# Patient Record
Sex: MALE | Race: WHITE | NOT HISPANIC OR LATINO | Employment: STUDENT | URBAN - METROPOLITAN AREA
[De-identification: names, ages, dates, MRNs, and addresses within clinical notes are randomized per-mention and may not be internally consistent; named-entity substitution may affect disease eponyms.]

---

## 2018-08-09 ENCOUNTER — OFFICE VISIT (OUTPATIENT)
Dept: INTERNAL MEDICINE | Facility: CLINIC | Age: 33
End: 2018-08-09
Payer: COMMERCIAL

## 2018-08-09 VITALS
WEIGHT: 173 LBS | DIASTOLIC BLOOD PRESSURE: 68 MMHG | BODY MASS INDEX: 26.22 KG/M2 | HEIGHT: 68 IN | HEART RATE: 82 BPM | SYSTOLIC BLOOD PRESSURE: 120 MMHG | OXYGEN SATURATION: 98 %

## 2018-08-09 DIAGNOSIS — Z00.00 HEALTH CARE MAINTENANCE: Primary | ICD-10-CM

## 2018-08-09 DIAGNOSIS — F42.9 OBSESSIVE-COMPULSIVE DISORDER, UNSPECIFIED TYPE: ICD-10-CM

## 2018-08-09 DIAGNOSIS — Z80.42 FAMILY HISTORY OF MALIGNANT NEOPLASM OF PROSTATE: ICD-10-CM

## 2018-08-09 DIAGNOSIS — Z12.5 SPECIAL SCREENING FOR MALIGNANT NEOPLASM OF PROSTATE: ICD-10-CM

## 2018-08-09 LAB
BILIRUB UR QL STRIP: NEGATIVE
CLARITY UR REFRACT.AUTO: ABNORMAL
COLOR UR AUTO: YELLOW
GLUCOSE UR QL STRIP: NEGATIVE
HGB UR QL STRIP: NEGATIVE
KETONES UR QL STRIP: NEGATIVE
LEUKOCYTE ESTERASE UR QL STRIP: NEGATIVE
MICROSCOPIC COMMENT: NORMAL
NITRITE UR QL STRIP: NEGATIVE
PH UR STRIP: 5 [PH] (ref 5–8)
PROT UR QL STRIP: NEGATIVE
SP GR UR STRIP: 1.02 (ref 1–1.03)
URN SPEC COLLECT METH UR: ABNORMAL
UROBILINOGEN UR STRIP-ACNC: NEGATIVE EU/DL
WBC #/AREA URNS AUTO: 1 /HPF (ref 0–5)

## 2018-08-09 PROCEDURE — 81001 URINALYSIS AUTO W/SCOPE: CPT

## 2018-08-09 PROCEDURE — 99385 PREV VISIT NEW AGE 18-39: CPT | Mod: S$GLB,,, | Performed by: INTERNAL MEDICINE

## 2018-08-09 PROCEDURE — 99999 PR PBB SHADOW E&M-NEW PATIENT-LVL IV: CPT | Mod: PBBFAC,,, | Performed by: INTERNAL MEDICINE

## 2018-08-09 NOTE — PROGRESS NOTES
"Subjective:       Patient ID: Emre Rapp is a 33 y.o. male.    Chief Complaint: Establish Care (establishing care as a new patient. )    HPI   Emre Rapp is a 33 y.o. male here to establish care and have yearly preventative healthcare visit.     He is originally from [place].   He has OCD. On medications as child but not currently. Has done lots of CBT.  Previously with handwashing obsession, now more existential.  He is a medical student.    Dad recently diagnosed with prostate cancer. Paternal uncles have also had prostate cancer (4 brothers total.) The youngest brother diagnosed was in late 50s, the rest over 65.     He has obsessional thoughts about his health - thinking about all the places cancer could be, etc. He recognizes that most of these thoughts are irrational and part of the OCD but does think that further investigation of family hx of prostate cancer would be rational.     He is interested in further CBT.     Review of Systems   Constitutional: Negative for fever.   HENT: Negative.    Eyes: Negative.    Respiratory: Negative for shortness of breath.    Cardiovascular: Negative for chest pain and leg swelling.   Gastrointestinal: Negative for abdominal pain, diarrhea, nausea and vomiting.   Genitourinary: Negative.    Musculoskeletal: Negative for arthralgias.   Skin: Negative for rash.   Psychiatric/Behavioral: Negative.        Objective:   /68 (BP Location: Right arm, Patient Position: Sitting, BP Method: Medium (Manual))   Pulse 82   Ht 5' 8" (1.727 m)   Wt 78.5 kg (173 lb)   SpO2 98%   BMI 26.30 kg/m²      Physical Exam   Constitutional: He is oriented to person, place, and time. He appears well-developed and well-nourished.   HENT:   Head: Normocephalic and atraumatic.   Eyes: Conjunctivae and EOM are normal. Pupils are equal, round, and reactive to light.   Neck: Neck supple. No thyromegaly present.   Cardiovascular: Normal rate, regular rhythm and normal heart sounds.    No " murmur heard.  Pulmonary/Chest: Effort normal and breath sounds normal. No respiratory distress. He has no wheezes.   Abdominal: Soft. Bowel sounds are normal. He exhibits no distension. There is no tenderness.   Musculoskeletal: Normal range of motion.   Neurological: He is alert and oriented to person, place, and time.   Skin: Skin is warm and dry. No rash noted.   Psychiatric: He has a normal mood and affect. Judgment and thought content normal.   Vitals reviewed.      Assessment:       1. Health care maintenance    2. Special screening for malignant neoplasm of prostate    3. Family history of malignant neoplasm of prostate    4. Obsessive-compulsive disorder, unspecified type        Plan:       Emre was seen today for [facility] care.    Diagnoses and all orders for this visit:    Health care maintenance  -     CBC auto differential; Future  -     Comprehensive metabolic panel; Future  -     Hemoglobin A1c; Future  -     TSH; Future  -     Vitamin D; Future  -     Lipid panel; Future  -     Urinalysis    Special screening for malignant neoplasm of prostate  -     PSA, Screening; Future    Family history of malignant neoplasm of prostate in father and 3 maternal uncles. I believe further investigation of this would be reasonable.   -     PSA, Screening; Future - baseline   urinalysis  -     Ambulatory referral to Urology    Obsessive-compulsive disorder, unspecified type  -     Ambulatory consult to Psychology

## 2018-08-09 NOTE — PATIENT INSTRUCTIONS
Cognitive Behavior Therapy [facility]  [phone]   [email or web address]

## 2018-08-11 ENCOUNTER — LAB VISIT (OUTPATIENT)
Dept: LAB | Facility: HOSPITAL | Age: 33
End: 2018-08-11
Attending: INTERNAL MEDICINE
Payer: COMMERCIAL

## 2018-08-11 DIAGNOSIS — Z12.5 SPECIAL SCREENING FOR MALIGNANT NEOPLASM OF PROSTATE: ICD-10-CM

## 2018-08-11 DIAGNOSIS — Z00.00 HEALTH CARE MAINTENANCE: ICD-10-CM

## 2018-08-11 DIAGNOSIS — Z80.42 FAMILY HISTORY OF MALIGNANT NEOPLASM OF PROSTATE: ICD-10-CM

## 2018-08-11 LAB
25(OH)D3+25(OH)D2 SERPL-MCNC: 17 NG/ML
ALBUMIN SERPL BCP-MCNC: 4.3 G/DL
ALP SERPL-CCNC: 47 U/L
ALT SERPL W/O P-5'-P-CCNC: 14 U/L
ANION GAP SERPL CALC-SCNC: 6 MMOL/L
AST SERPL-CCNC: 15 U/L
BASOPHILS # BLD AUTO: 0.03 K/UL
BASOPHILS NFR BLD: 0.6 %
BILIRUB SERPL-MCNC: 0.7 MG/DL
BUN SERPL-MCNC: 13 MG/DL
CALCIUM SERPL-MCNC: 9.1 MG/DL
CHLORIDE SERPL-SCNC: 112 MMOL/L
CHOLEST SERPL-MCNC: 182 MG/DL
CHOLEST/HDLC SERPL: 3.3 {RATIO}
CO2 SERPL-SCNC: 24 MMOL/L
COMPLEXED PSA SERPL-MCNC: 0.65 NG/ML
CREAT SERPL-MCNC: 1.1 MG/DL
DIFFERENTIAL METHOD: ABNORMAL
EOSINOPHIL # BLD AUTO: 0.1 K/UL
EOSINOPHIL NFR BLD: 1.8 %
ERYTHROCYTE [DISTWIDTH] IN BLOOD BY AUTOMATED COUNT: 11.9 %
EST. GFR  (AFRICAN AMERICAN): >60 ML/MIN/1.73 M^2
EST. GFR  (NON AFRICAN AMERICAN): >60 ML/MIN/1.73 M^2
ESTIMATED AVG GLUCOSE: 97 MG/DL
GLUCOSE SERPL-MCNC: 96 MG/DL
HBA1C MFR BLD HPLC: 5 %
HCT VFR BLD AUTO: 40.7 %
HDLC SERPL-MCNC: 56 MG/DL
HDLC SERPL: 30.8 %
HGB BLD-MCNC: 14.1 G/DL
LDLC SERPL CALC-MCNC: 117.4 MG/DL
LYMPHOCYTES # BLD AUTO: 2.8 K/UL
LYMPHOCYTES NFR BLD: 55.9 %
MCH RBC QN AUTO: 29.9 PG
MCHC RBC AUTO-ENTMCNC: 34.6 G/DL
MCV RBC AUTO: 86 FL
MONOCYTES # BLD AUTO: 0.4 K/UL
MONOCYTES NFR BLD: 8.7 %
NEUTROPHILS # BLD AUTO: 1.6 K/UL
NEUTROPHILS NFR BLD: 33 %
NONHDLC SERPL-MCNC: 126 MG/DL
PLATELET # BLD AUTO: 267 K/UL
PMV BLD AUTO: 10.2 FL
POTASSIUM SERPL-SCNC: 4.3 MMOL/L
PROT SERPL-MCNC: 6.8 G/DL
RBC # BLD AUTO: 4.72 M/UL
SODIUM SERPL-SCNC: 142 MMOL/L
TRIGL SERPL-MCNC: 43 MG/DL
TSH SERPL DL<=0.005 MIU/L-ACNC: 0.96 UIU/ML
WBC # BLD AUTO: 4.94 K/UL

## 2018-08-11 PROCEDURE — 84153 ASSAY OF PSA TOTAL: CPT

## 2018-08-11 PROCEDURE — 84443 ASSAY THYROID STIM HORMONE: CPT

## 2018-08-11 PROCEDURE — 82306 VITAMIN D 25 HYDROXY: CPT

## 2018-08-11 PROCEDURE — 80053 COMPREHEN METABOLIC PANEL: CPT

## 2018-08-11 PROCEDURE — 85025 COMPLETE CBC W/AUTO DIFF WBC: CPT

## 2018-08-11 PROCEDURE — 83036 HEMOGLOBIN GLYCOSYLATED A1C: CPT

## 2018-08-11 PROCEDURE — 36415 COLL VENOUS BLD VENIPUNCTURE: CPT

## 2018-08-11 PROCEDURE — 80061 LIPID PANEL: CPT

## 2018-08-14 ENCOUNTER — OFFICE VISIT (OUTPATIENT)
Dept: UROLOGY | Facility: CLINIC | Age: 33
End: 2018-08-14
Payer: COMMERCIAL

## 2018-08-14 DIAGNOSIS — Z80.42 FAMILY HISTORY OF PROSTATE CANCER: Primary | ICD-10-CM

## 2018-08-14 PROCEDURE — 99999 PR PBB SHADOW E&M-EST. PATIENT-LVL II: CPT | Mod: PBBFAC,,, | Performed by: NURSE PRACTITIONER

## 2018-08-14 PROCEDURE — 99202 OFFICE O/P NEW SF 15 MIN: CPT | Mod: S$GLB,,, | Performed by: NURSE PRACTITIONER

## 2018-08-14 NOTE — LETTER
August 15, 2018      Bonita Rivera MD  [address]  [place] [address]           [facility] - Urology 4th Floor  [address]  [facility] [address]  Phone: [phone]          Patient: Emre Rapp   MR Number: [id number]   YOB: 1985   Date of Visit: 8/14/2018       Dear Dr. Bonita iRvera:    Thank you for referring Emre Rapp to me for evaluation. Attached you will find relevant portions of my assessment and plan of care.    If you have questions, please do not hesitate to call me. I look forward to following Emre Rapp along with you.    Sincerely,    Callie Gonzales, KIKO    Enclosure  CC:  No Recipients    If you would like to receive this communication electronically, please contact [email or web address] or [phone] to request more information on [employer] Link access.    For providers and/or their staff who would like to refer a patient to [facility], please contact us through our one-stop-shop provider referral line, Melody Jaime, at [phone].    If you feel you have received this communication in error or would no longer like to receive these types of communications, please e-mail [email or web address]

## 2018-08-15 ENCOUNTER — PATIENT MESSAGE (OUTPATIENT)
Dept: UROLOGY | Facility: CLINIC | Age: 33
End: 2018-08-15

## 2018-08-15 ENCOUNTER — TELEPHONE (OUTPATIENT)
Dept: UROLOGY | Facility: CLINIC | Age: 33
End: 2018-08-15

## 2018-08-15 NOTE — TELEPHONE ENCOUNTER
Called patient regarding f/u of genetic testing for prostate cancer. No answer. Will send message through portal.

## 2018-08-15 NOTE — PROGRESS NOTES
Subjective:       Patient ID: Emre Rapp is a 33 y.o. male.    Chief Complaint: Prostate check / + Family history of prostate cancer      HPI: Emre Rapp is a 33 y.o. White male who presents today for evaluation and management of prostate check. He is an established patient with [facility] but a new patient to me. This is his initial clinic visit.    He established care recently with PCP. He has family history of prostate cancer. PCP ordered PSA and referred him to Urology.    Pt reports a family history of prostate cancer (father and 3 paternal uncles). His father was diagnosed after age 65 and is scheduled to have prostatectomy. Pt is concerned and is interested in genetic testing for prostate cancer. Denies urinary complaints. Denies dysuria, hematuria, or flank pain. Denies f/c/n/v.    Lab Results   Component Value Date    PSA 0.65 08/11/2018     Review of patient's allergies indicates:  No Known Allergies    No current outpatient medications on file.     No current facility-administered medications for this visit.        Past Medical History:   Diagnosis Date    OCD (obsessive compulsive disorder)        History reviewed. No pertinent surgical history.    Family History   Problem Relation Age of Onset    Prostate cancer Father 67    Hyperlipidemia Father     Hypertension Father     Prostate cancer Paternal Uncle 58    Prostate cancer Paternal Uncle 70    Prostate cancer Paternal Uncle 67    Diabetes Mother     No Known Problems Sister     Cancer Maternal Grandmother         GI - unsure type - colon?    Heart attack Maternal Grandfather     Anuerysm Paternal Grandfather 64        cerebral    Diabetes Maternal Uncle        Review of Systems   Constitutional: Negative for chills, diaphoresis and fever.   HENT: Negative for congestion and trouble swallowing.    Eyes: Negative for visual disturbance.   Respiratory: Negative for chest tightness and shortness of breath.    Cardiovascular: Negative  for chest pain and palpitations.   Gastrointestinal: Negative for nausea and vomiting.   Genitourinary: Negative for decreased urine volume, difficulty urinating, discharge, dysuria, enuresis, flank pain, frequency, hematuria, penile pain, penile swelling, scrotal swelling, testicular pain and urgency.   Musculoskeletal: Negative for gait problem.   Skin: Negative for rash.   Allergic/Immunologic: Negative for immunocompromised state.   Neurological: Negative for seizures, syncope and headaches.   Hematological: Negative for adenopathy.   Psychiatric/Behavioral: Negative for confusion.         All other systems were reviewed and were negative.    Objective:   There were no vitals filed for this visit.     Physical Exam   Nursing note and vitals reviewed.  Constitutional: He is oriented to person, place, and time. He appears well-developed and well-nourished.  Non-toxic appearance. He does not have a sickly appearance. He does not appear ill. No distress.   HENT:   Head: Normocephalic.   Eyes: Conjunctivae are normal.   Neck: Normal range of motion.   Cardiovascular: Normal rate and regular rhythm.    Pulmonary/Chest: Effort normal. No respiratory distress.   Abdominal: Soft. He exhibits no distension. There is no CVA tenderness.   Genitourinary:       Musculoskeletal: Normal range of motion.   Neurological: He is alert and oriented to person, place, and time.   Skin: Skin is warm and dry.     Psychiatric: He has a normal mood and affect. His behavior is normal. Judgment and thought content normal.         Lab Results   Component Value Date    CREATININE 1.1 08/11/2018     Lab Results   Component Value Date    EGFRNONAA >60 08/11/2018     Lab Results   Component Value Date    ESTGFRAFRICA >60 08/11/2018     POCT UA: negative results    Assessment:       1. Family history of prostate cancer        Plan:     Emre was seen today for other.    Diagnoses and all orders for this visit:    Family history of prostate  cancer    -Discussed plan with patient  -Informed patient we do not do genetic testing for prostate cancer in Urology.   I will speak to one of the Urologist and see if they have any recommendations on who to see regarding genetic testing for prostate cancer.  -Reassured patient negative JESSIE and PSA normal.  Recommend yearly screening starting at age 40.  -RTC as needed       I spent 30 minutes with the patient of which more than half was spent in coordinating the patient's care as well as in direct consultation with the patient in regards to our treatment and plan.

## 2019-05-27 ENCOUNTER — TELEPHONE (OUTPATIENT)
Dept: INTERNAL MEDICINE | Facility: CLINIC | Age: 34
End: 2019-05-27

## 2019-05-27 NOTE — TELEPHONE ENCOUNTER
----- Message from Priscilla Raman sent at 5/27/2019  9:20 AM CDT -----  Contact: self/[phone]  Pt called in regards to talking to the office about what shot does he need. He is trying to fill out a work application.      Please advise

## 2019-05-28 ENCOUNTER — OFFICE VISIT (OUTPATIENT)
Dept: INTERNAL MEDICINE | Facility: CLINIC | Age: 34
End: 2019-05-28
Payer: COMMERCIAL

## 2019-05-28 ENCOUNTER — CLINICAL SUPPORT (OUTPATIENT)
Dept: INTERNAL MEDICINE | Facility: CLINIC | Age: 34
End: 2019-05-28
Payer: COMMERCIAL

## 2019-05-28 VITALS
BODY MASS INDEX: 27.96 KG/M2 | HEIGHT: 68 IN | OXYGEN SATURATION: 97 % | DIASTOLIC BLOOD PRESSURE: 88 MMHG | WEIGHT: 184.5 LBS | HEART RATE: 80 BPM | SYSTOLIC BLOOD PRESSURE: 130 MMHG

## 2019-05-28 DIAGNOSIS — M79.601 RIGHT ARM PAIN: ICD-10-CM

## 2019-05-28 PROCEDURE — 90471 TDAP VACCINE GREATER THAN OR EQUAL TO 7YO IM: ICD-10-PCS | Mod: S$GLB,,, | Performed by: STUDENT IN AN ORGANIZED HEALTH CARE EDUCATION/TRAINING PROGRAM

## 2019-05-28 PROCEDURE — 99213 PR OFFICE/OUTPT VISIT, EST, LEVL III, 20-29 MIN: ICD-10-PCS | Mod: 25,S$GLB,, | Performed by: STUDENT IN AN ORGANIZED HEALTH CARE EDUCATION/TRAINING PROGRAM

## 2019-05-28 PROCEDURE — 3008F PR BODY MASS INDEX (BMI) DOCUMENTED: ICD-10-PCS | Mod: CPTII,S$GLB,, | Performed by: STUDENT IN AN ORGANIZED HEALTH CARE EDUCATION/TRAINING PROGRAM

## 2019-05-28 PROCEDURE — 99999 PR PBB SHADOW E&M-EST. PATIENT-LVL III: ICD-10-PCS | Mod: PBBFAC,,, | Performed by: STUDENT IN AN ORGANIZED HEALTH CARE EDUCATION/TRAINING PROGRAM

## 2019-05-28 PROCEDURE — 99213 OFFICE O/P EST LOW 20 MIN: CPT | Mod: 25,S$GLB,, | Performed by: STUDENT IN AN ORGANIZED HEALTH CARE EDUCATION/TRAINING PROGRAM

## 2019-05-28 PROCEDURE — 90471 IMMUNIZATION ADMIN: CPT | Mod: S$GLB,,, | Performed by: STUDENT IN AN ORGANIZED HEALTH CARE EDUCATION/TRAINING PROGRAM

## 2019-05-28 PROCEDURE — 90715 TDAP VACCINE 7 YRS/> IM: CPT | Mod: S$GLB,,, | Performed by: STUDENT IN AN ORGANIZED HEALTH CARE EDUCATION/TRAINING PROGRAM

## 2019-05-28 PROCEDURE — 3008F BODY MASS INDEX DOCD: CPT | Mod: CPTII,S$GLB,, | Performed by: STUDENT IN AN ORGANIZED HEALTH CARE EDUCATION/TRAINING PROGRAM

## 2019-05-28 PROCEDURE — 99999 PR PBB SHADOW E&M-EST. PATIENT-LVL III: CPT | Mod: PBBFAC,,, | Performed by: STUDENT IN AN ORGANIZED HEALTH CARE EDUCATION/TRAINING PROGRAM

## 2019-05-28 PROCEDURE — 90715 TDAP VACCINE GREATER THAN OR EQUAL TO 7YO IM: ICD-10-PCS | Mod: S$GLB,,, | Performed by: STUDENT IN AN ORGANIZED HEALTH CARE EDUCATION/TRAINING PROGRAM

## 2019-05-28 NOTE — PATIENT INSTRUCTIONS
Please go to Immunization station to receive MMR, pertussis (dTap), Hepatitis A vaccinations. MMR and Hepatitis A will require another (a second) vaccine.

## 2019-05-28 NOTE — PROGRESS NOTES
"Internal Medicine Clinic Note  5/28/2019      Subjective:       Patient ID:  Emre is a 33 y.o. male being seen for an established visit.    Chief Complaint: Forms    Mr. Rapp is a 33 year old man who is presenting with right arm pain and to have medical forms filled out. Regarding the right arm pain, patient says that it started about a decade ago and has been getting worse recently. Patient is left handed. He states that the pain is from the wrist to the bicep that is a "dull ache". It's worse when he doesn't move the arm around for a long time. At its worst, the pain is at a 6. The pain is relieved with keeping the arm active. He has tried taking Ibuprofen 800mg, and says that is helped "some". The pain is at a 2 after movement and if he takes Ibuprofen although he does say that he has not taken the Ibuprofen enough to notice if it is truly helping with the pain. The pain has been constant and now interferes with activities that he enjoys such as rock climbing and canoeing, which he has not done for the past 3 months or so. He has not had any recent fevers, chills, numbness, tingling, trauma, injuries, or falls. He reports that sensation and motor strength have been intact. He is concerned that this pain may be related to an autoimmune or rheumatological disorder.    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 He needs proof of vaccinations.      Review of Systems   Constitutional: Negative for chills and fever.   HENT: Negative for congestion and sore throat.    Eyes: Negative for photophobia and pain.   Respiratory: Negative for cough and shortness of breath.    Cardiovascular: Negative for chest pain and leg swelling.   Gastrointestinal: Negative for abdominal pain and nausea.   Genitourinary: Negative for dysuria and flank pain.   Musculoskeletal: Positive for myalgias (R arm pain). Negative for falls and joint pain.   Neurological: Negative for dizziness and " "headaches.   Psychiatric/Behavioral: Negative for depression. The patient is not nervous/anxious.        Past Medical History:   Diagnosis Date    OCD (obsessive compulsive disorder)        Family History   Problem Relation Age of Onset    Prostate cancer Father 67    Hyperlipidemia Father     Hypertension Father     Prostate cancer Paternal Uncle 58    Prostate cancer Paternal Uncle 70    Prostate cancer Paternal Uncle 67    Diabetes Mother     No Known Problems Sister     Cancer Maternal Grandmother         GI - unsure type - colon?    Heart attack Maternal Grandfather     Anuerysm Paternal Grandfather 64        cerebral    Diabetes Maternal Uncle        Review of patient's allergies indicates:  No Known Allergies    Patient Active Problem List   Diagnosis    Right arm pain           Objective:      /88 (BP Location: Left arm, Patient Position: Sitting, BP Method: Large (Manual))   Pulse 80   Ht 5' 8" (1.727 m)   Wt 83.7 kg (184 lb 8.4 oz)   SpO2 97%   BMI 28.06 kg/m²   Estimated body mass index is 28.06 kg/m² as calculated from the following:    Height as of this encounter: 5' 8" (1.727 m).    Weight as of this encounter: 83.7 kg (184 lb 8.4 oz).     Physical Exam   Constitutional: He is oriented to person, place, and time and well-developed, well-nourished, and in no distress. No distress.   HENT:   Head: Normocephalic and atraumatic.   Eyes: EOM are normal.   Neck: Normal range of motion. Neck supple.   Cardiovascular: Normal rate and regular rhythm.   No murmur heard.  Pulmonary/Chest: Effort normal and breath sounds normal. No respiratory distress.   Abdominal: Soft. Bowel sounds are normal. He exhibits no distension. There is no tenderness.   Musculoskeletal: Normal range of motion. He exhibits tenderness (tenderness on palpation of lower arm tendons ). He exhibits no edema or deformity.   No joint swelling or erythema of the joints   Neurological: He is alert and oriented to person, " place, and time.   Skin: Skin is warm and dry.   Psychiatric: Affect and judgment normal.         Assessment and Plan:         Emre was seen today for forms and right arm pain.    Diagnoses and all orders for this visit:    Medical forms were filled out based on previous vaccination records. As there was not adequate documentation for pertussis, Hepatitis A, and MMR, patient was encouraged to receive all 3 vaccinations today. Patient will receive tDap along with first doses of Hepatitis A and MMR today. He was encouraged to received the second dose of MMR within a month and the second dose of Hepatitis A within 6-12 months.     Right arm pain   Patient's HPI and PE appear to be more consistent with musculoskeletal issue due to overuse. Patient does not have any joint swelling, erythema, rashes that would be concerning for an autoimmune or rheumatological disorder. He does not have sensation or motor strength issues that would indicate a Neurological problem. Previous labs done in August 2018 were largely unremarkable. Patient was taking Ibuprofen as needed and not enough to the point whether he could detect if it helped with the pain. Patient was encouraged to take Ibuprofen up to 800mg three times a day as a maximum dose. He was advised to take this an hour before physical activity and afterwards as well. He was also encouraged to try Aleve if he felt that Ibuprofen was not improving the pain as well as add Tylenol to Ibuprofen or Aleve alone.       Follow up if symptoms worsen or fail to improve.    Other Orders Placed This Visit:  No orders of the defined types were placed in this encounter.        Patient seen, and case discussed with Dr. Lee.        Miroslava Ojeda MD PGY-1  [facility]  Internal Medicine

## 2019-06-06 NOTE — PROGRESS NOTES
Preceptor note    Patient's history and physical discussed, please refer to resident physician's note for specific details. Pt seen and examined with resident physician. Bilateral upper extremity exam - normal muscle tone; no joint swelling; full ROM. Medical record reviewed. I agree with resident's assessment and plan.

## 2019-09-29 ENCOUNTER — HEALTH MAINTENANCE LETTER (OUTPATIENT)
Age: 34
End: 2019-09-29

## 2020-10-05 ENCOUNTER — PATIENT MESSAGE (OUTPATIENT)
Dept: ADMINISTRATIVE | Facility: HOSPITAL | Age: 35
End: 2020-10-05

## 2021-01-04 ENCOUNTER — PATIENT MESSAGE (OUTPATIENT)
Dept: ADMINISTRATIVE | Facility: HOSPITAL | Age: 36
End: 2021-01-04

## 2021-01-14 ENCOUNTER — HEALTH MAINTENANCE LETTER (OUTPATIENT)
Age: 36
End: 2021-01-14

## 2021-04-05 ENCOUNTER — PATIENT MESSAGE (OUTPATIENT)
Dept: ADMINISTRATIVE | Facility: HOSPITAL | Age: 36
End: 2021-04-05

## 2021-07-06 ENCOUNTER — PATIENT MESSAGE (OUTPATIENT)
Dept: ADMINISTRATIVE | Facility: HOSPITAL | Age: 36
End: 2021-07-06

## 2021-10-24 ENCOUNTER — HEALTH MAINTENANCE LETTER (OUTPATIENT)
Age: 36
End: 2021-10-24

## 2022-02-13 ENCOUNTER — HEALTH MAINTENANCE LETTER (OUTPATIENT)
Age: 37
End: 2022-02-13

## 2022-10-10 ENCOUNTER — HEALTH MAINTENANCE LETTER (OUTPATIENT)
Age: 37
End: 2022-10-10

## 2023-03-25 ENCOUNTER — HEALTH MAINTENANCE LETTER (OUTPATIENT)
Age: 38
End: 2023-03-25